# Patient Record
Sex: MALE | Race: WHITE
[De-identification: names, ages, dates, MRNs, and addresses within clinical notes are randomized per-mention and may not be internally consistent; named-entity substitution may affect disease eponyms.]

---

## 2017-09-29 ENCOUNTER — HOSPITAL ENCOUNTER (EMERGENCY)
Dept: HOSPITAL 8 - ED | Age: 37
Discharge: HOME | End: 2017-09-29
Payer: COMMERCIAL

## 2017-09-29 VITALS — HEIGHT: 66 IN | BODY MASS INDEX: 29.83 KG/M2 | WEIGHT: 185.63 LBS

## 2017-09-29 VITALS — SYSTOLIC BLOOD PRESSURE: 145 MMHG | DIASTOLIC BLOOD PRESSURE: 85 MMHG
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
## 2017-09-29 DIAGNOSIS — S16.1XXA: ICD-10-CM

## 2017-09-29 DIAGNOSIS — S33.5XXA: Primary | ICD-10-CM

## 2017-09-29 PROCEDURE — 70450 CT HEAD/BRAIN W/O DYE: CPT

## 2017-09-29 PROCEDURE — 99284 EMERGENCY DEPT VISIT MOD MDM: CPT

## 2017-09-29 PROCEDURE — 72110 X-RAY EXAM L-2 SPINE 4/>VWS: CPT

## 2017-09-29 PROCEDURE — 72050 X-RAY EXAM NECK SPINE 4/5VWS: CPT

## 2017-09-29 PROCEDURE — 72020 X-RAY EXAM OF SPINE 1 VIEW: CPT

## 2019-07-25 ENCOUNTER — HOSPITAL ENCOUNTER (EMERGENCY)
Dept: HOSPITAL 8 - ED | Age: 39
Discharge: HOME | End: 2019-07-25
Payer: COMMERCIAL

## 2019-07-25 VITALS — SYSTOLIC BLOOD PRESSURE: 163 MMHG | DIASTOLIC BLOOD PRESSURE: 84 MMHG

## 2019-07-25 VITALS — HEIGHT: 69 IN | WEIGHT: 191.8 LBS | BODY MASS INDEX: 28.41 KG/M2
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
## 2019-07-25 DIAGNOSIS — M79.5: ICD-10-CM
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
## 2019-07-25 DIAGNOSIS — F17.200: ICD-10-CM
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
## 2019-07-25 DIAGNOSIS — S71.041A: Primary | ICD-10-CM

## 2019-07-25 PROCEDURE — 90471 IMMUNIZATION ADMIN: CPT

## 2019-07-25 PROCEDURE — 90715 TDAP VACCINE 7 YRS/> IM: CPT

## 2019-09-11 ENCOUNTER — OFFICE VISIT (OUTPATIENT)
Dept: URGENT CARE | Facility: CLINIC | Age: 39
End: 2019-09-11
Payer: COMMERCIAL

## 2019-09-11 VITALS
HEART RATE: 88 BPM | OXYGEN SATURATION: 96 % | WEIGHT: 195 LBS | BODY MASS INDEX: 28.8 KG/M2 | DIASTOLIC BLOOD PRESSURE: 64 MMHG | SYSTOLIC BLOOD PRESSURE: 114 MMHG | TEMPERATURE: 98.8 F | RESPIRATION RATE: 16 BRPM

## 2019-09-11 DIAGNOSIS — G51.0 BELL'S PALSY: ICD-10-CM

## 2019-09-11 DIAGNOSIS — G51.0 FACIAL PARALYSIS: ICD-10-CM

## 2019-09-11 PROCEDURE — 99214 OFFICE O/P EST MOD 30 MIN: CPT | Performed by: NURSE PRACTITIONER

## 2019-09-11 RX ORDER — PREDNISONE 10 MG/1
TABLET ORAL
Qty: 45 TAB | Refills: 0 | Status: SHIPPED | OUTPATIENT
Start: 2019-09-11 | End: 2019-09-21

## 2019-09-11 RX ORDER — VALACYCLOVIR HYDROCHLORIDE 1 G/1
1000 TABLET, FILM COATED ORAL 3 TIMES DAILY
Qty: 21 TAB | Refills: 0 | Status: SHIPPED | OUTPATIENT
Start: 2019-09-11 | End: 2019-09-18

## 2019-09-11 RX ORDER — SUMATRIPTAN 50 MG/1
TABLET, FILM COATED ORAL
Refills: 5 | COMMUNITY
Start: 2019-08-16

## 2019-09-11 ASSESSMENT — ENCOUNTER SYMPTOMS
TINGLING: 0
TREMORS: 0
WEAKNESS: 0
SORE THROAT: 0
NAUSEA: 0
SHORTNESS OF BREATH: 0
DIZZINESS: 0
VOMITING: 0
FOCAL WEAKNESS: 0
HEADACHES: 0
SEIZURES: 0
EYE PAIN: 0
CHILLS: 0
LOSS OF CONSCIOUSNESS: 0
SENSORY CHANGE: 0
SPEECH CHANGE: 0
FEVER: 0
MYALGIAS: 0

## 2019-09-12 NOTE — PROGRESS NOTES
Subjective:   Gigi Montague is a 39 y.o. male who presents for Other (pt states he noticed his (L) side of face going numb x 2 days)  Patient is a 39-year-old male who presents clinic today for evaluation of left side facial numbness over the past 2 days.  Patient states that 2 days ago he noticed some irritation of his left eye which then followed with numbness and paralysis of the left side of his face.  He denies any slurred speech, abnormal behavior, left arm numbness or weakness.  He denies any fever, chills.Denies any blurred vision, headache.  He denies any recent trauma or injury.  He denies any recent upper respiratory infection.  He denies any pain of the left side of his face.  Patient has not taken anything for the symptoms.        HPI  Review of Systems   Constitutional: Negative for chills and fever.   HENT: Negative for sore throat.    Eyes: Negative for pain.   Respiratory: Negative for shortness of breath.    Cardiovascular: Negative for chest pain.   Gastrointestinal: Negative for nausea and vomiting.   Genitourinary: Negative for hematuria.   Musculoskeletal: Negative for myalgias.   Skin: Negative for rash.   Neurological: Negative for dizziness, tingling, tremors, sensory change, speech change, focal weakness, seizures, loss of consciousness, weakness and headaches.        Left side facial paralysis     No Known Allergies   Objective:   /64 (BP Location: Left arm, Patient Position: Sitting, BP Cuff Size: Large adult)   Pulse 88   Temp 37.1 °C (98.8 °F) (Temporal)   Resp 16   Wt 88.5 kg (195 lb)   SpO2 96%   BMI 28.80 kg/m²   Physical Exam   Constitutional: He is oriented to person, place, and time. He appears well-developed and well-nourished. No distress.   HENT:   Head: Normocephalic and atraumatic.   Eyes: Pupils are equal, round, and reactive to light. Conjunctivae and EOM are normal.   Cardiovascular: Normal rate and regular rhythm.   No murmur heard.  Pulmonary/Chest: Effort  normal and breath sounds normal. No respiratory distress.   Abdominal: Soft. He exhibits no distension. There is no tenderness.   Neurological: He is alert and oriented to person, place, and time. He has normal strength and normal reflexes. A cranial nerve deficit is present. No sensory deficit. He displays a negative Romberg sign. GCS eye subscore is 4. GCS verbal subscore is 5. GCS motor subscore is 6.   Left-sided facial paralysis.    Skin: Skin is warm and dry.   Psychiatric: He has a normal mood and affect.         Assessment/Plan:     1. Facial paralysis  valacyclovir (VALTREX) 1 GM Tab    REFERRAL TO FOLLOW-UP WITH PRIMARY CARE    REFERRAL TO NEUROLOGY    predniSONE (DELTASONE) 10 MG Tab   2. Bell's palsy  valacyclovir (VALTREX) 1 GM Tab    REFERRAL TO FOLLOW-UP WITH PRIMARY CARE    REFERRAL TO NEUROLOGY    predniSONE (DELTASONE) 10 MG Tab       Patient is a 39-year-old male who present with stated above.  Patient with left-sided paralysis however A&O x4, no upper extremity paralysis or weakness.  Speech normal no signs of stroke.  Symptoms have been present for the past 2 days  And have not improved and/or worsened, which appear to be consistent with Bell's palsy.  Patient without fever.  At this time will start patient on prednisone, antiviral.  Did encourage patient to wear eye protection in eyedrops for the left eye.  Will have patient follow-up as soon as possible with primary care provider.  We will also place referral to neurology.  Patient given precautionary s/sx that mandate immediate follow up and evaluation in the ED. Advised of risks of not doing so.    DDX, Supportive care, and indications for immediate follow-up discussed with patient.    Instructed to return to clinic or nearest emergency department if we are not available for any change in condition, further concerns, or worsening of symptoms.    The patient demonstrated a good understanding and agreed with the treatment plan.